# Patient Record
(demographics unavailable — no encounter records)

---

## 2025-06-24 NOTE — OB HISTORY
[Total Preg ___] : : [unfilled] [Living ___] : [unfilled] (living) [Vaginal ___] : [unfilled] vaginal delivery(s) [Menarche Age ____] : age at menarche was [unfilled] [Menopause  Age ____] : menopause occurred at age [unfilled] [Full Term ___] : [unfilled] (full-term)

## 2025-06-24 NOTE — PROCEDURE
[Endometrial Biopsy] : an endometrial biopsy [Cervical Pap] : cervical pap smear  [Postmenopausal Bleeding] : postmenopausal bleeding [Verbal Consent] : verbal consent was obtained prior to the procedure and is detailed in the patient's record

## 2025-06-24 NOTE — HISTORY OF PRESENT ILLNESS
[FreeTextEntry1] : 79 year old, para 2-0-0-2, LMP at age 50 with c/o an episode of vaginal bleeding in May.  She states she was evaluated by her PCP who performed urine culture that resulted negative.  She denies pain and urinary frequency at this time.  She has a history of HTN.  Patient states her last GYN evaluation was 2 years ago and reports negative pap and has not had any recent imaging.  Presents today for initial office evaluation.

## 2025-06-24 NOTE — ASSESSMENT
[FreeTextEntry1] : 79 year old, para 2-0-0-2, LMP at age 50 with c/o an episode of vaginal bleeding in May. She states she was evaluated by her PCP who performed urine culture that resulted negative. She denies pain and urinary frequency at this time. She has a history of HTN. Patient states her last GYN evaluation was 2 years ago and reports negative pap and has not had any recent imaging.

## 2025-06-24 NOTE — PHYSICAL EXAM
[Chaperoned Physical Exam] : A chaperone was present in the examining room during all aspects of the physical examination. [MA] : MA [FreeTextEntry2] : Camelia Garcia [TextEntry] : Well - developed, well-nourished female in no acute distress HEENT - wnl Breasts - symmetrical w/o masses or nipple discharge Abdomen - soft, non-tender, +BS Back - no CVA tenderness or spinal tenderness Extremities - w/o clubbing, cyanosis, no lesions noted Neuro - AAOx3  Pelvic Exam External Genitalia - with out lesions Vagina - mucosa atrophic, cystocele noted. Cervix - no lesions Uterus -  6 wk size, nontender, mobile with GII prolapse Adnexa - no palpable masses or tenderness b/l Rectovaginal - confirmatory

## 2025-06-24 NOTE — PLAN
[TextEntry] : Pap done Endometrial sampling done. TVS  F/u visit after TVS Consider for CT scan of Abdomen and Pelvis

## 2025-07-11 NOTE — HISTORY OF PRESENT ILLNESS
[FreeTextEntry1] : 79 year old, para 2-0-0-2, LMP at age 50 with c/o an episode of vaginal bleeding in May. She states she was evaluated by her PCP who performed urine culture that resulted negative. She denies pain and urinary frequency at this time. She has a history of HTN. Patient states her last GYN evaluation was 2 years ago and reports negative pap and has not had any recent imaging.  Last seen in GYN/ONC on 06/24/25, endometrial sampling and pap done, patient referred for TVS.  Pap: Test cancelled by [facility]  Endometrial Bx:  Specimen(s) Submitted Endometrium Final Diagnosis Endometrium, biopsy: - Necrotic debris and acute inflammatory cells. - Intact endometrium not present. - Scant benign endocervical glands present.  TVS (06/30/25) IMPRESSION: Enlarged (392 g) leiomyomatous uterus. Small amount of fluid in the endometrial canal. 4mm left sided endometrial polyp in this woman who presents with a history of postmenopausal bleeding. It is noted that the patient is being followed by a gynecologic oncologist. Both ovaries obscured by regional bowel gas. FINDINGS: The uterus measures 9.7 x 7.5 x 10.3 cm. It is anteverted in position. Uterine leiomyomata are present. Some examples include the following: A posterior corpus heavily calcified subserosal leiomyoma measures 3.5 x 3.5 x 4.4 cm. A fundal subserosal leiomyoma measures 7.6 x 7.7 x 6.9 cm. There is a small amount of fluid in the endometrial canal. The anterior and posterior endometrial walls measure 2 mm each. However, at the level of the mid corpus, arising from the left side of the endometrium, there is a 4 x 3 x 3 mm endometrial polyp. The cervix is unremarkable. Both ovaries are obscured by bowel gas. There is no free fluid.  Patient presents today for follow up.